# Patient Record
Sex: FEMALE | Race: BLACK OR AFRICAN AMERICAN | NOT HISPANIC OR LATINO | Employment: FULL TIME | URBAN - METROPOLITAN AREA
[De-identification: names, ages, dates, MRNs, and addresses within clinical notes are randomized per-mention and may not be internally consistent; named-entity substitution may affect disease eponyms.]

---

## 2023-01-17 PROCEDURE — 99284 EMERGENCY DEPT VISIT MOD MDM: CPT

## 2023-01-17 RX ORDER — SODIUM CHLORIDE 0.9 % (FLUSH) 0.9 %
10 SYRINGE (ML) INJECTION AS NEEDED
Status: DISCONTINUED | OUTPATIENT
Start: 2023-01-17 | End: 2023-01-18

## 2023-01-17 RX ORDER — ASPIRIN 81 MG/1
324 TABLET, CHEWABLE ORAL ONCE
Status: COMPLETED | OUTPATIENT
Start: 2023-01-18 | End: 2023-01-18

## 2023-01-18 ENCOUNTER — HOSPITAL ENCOUNTER (OUTPATIENT)
Facility: HOSPITAL | Age: 53
Discharge: HOME | End: 2023-01-18
Attending: EMERGENCY MEDICINE | Admitting: INTERNAL MEDICINE
Payer: COMMERCIAL

## 2023-01-18 ENCOUNTER — APPOINTMENT (OUTPATIENT)
Dept: GENERAL RADIOLOGY | Facility: HOSPITAL | Age: 53
End: 2023-01-18
Payer: COMMERCIAL

## 2023-01-18 ENCOUNTER — APPOINTMENT (OUTPATIENT)
Dept: CARDIOLOGY | Facility: HOSPITAL | Age: 53
End: 2023-01-18
Payer: COMMERCIAL

## 2023-01-18 VITALS
TEMPERATURE: 98.2 F | SYSTOLIC BLOOD PRESSURE: 151 MMHG | RESPIRATION RATE: 16 BRPM | BODY MASS INDEX: 37.56 KG/M2 | WEIGHT: 262.35 LBS | HEIGHT: 70 IN | OXYGEN SATURATION: 97 % | DIASTOLIC BLOOD PRESSURE: 93 MMHG | HEART RATE: 84 BPM

## 2023-01-18 DIAGNOSIS — Z86.39 HISTORY OF OBESITY: ICD-10-CM

## 2023-01-18 DIAGNOSIS — R11.0 NAUSEA: ICD-10-CM

## 2023-01-18 DIAGNOSIS — Z86.2 HISTORY OF ANEMIA: ICD-10-CM

## 2023-01-18 DIAGNOSIS — R68.84 JAW PAIN: ICD-10-CM

## 2023-01-18 DIAGNOSIS — E87.6 HYPOKALEMIA: ICD-10-CM

## 2023-01-18 DIAGNOSIS — R07.89 ATYPICAL CHEST PAIN: Primary | ICD-10-CM

## 2023-01-18 DIAGNOSIS — Z86.79 HISTORY OF HYPERTENSION: ICD-10-CM

## 2023-01-18 DIAGNOSIS — R06.02 SHORTNESS OF BREATH: ICD-10-CM

## 2023-01-18 PROBLEM — I10 HTN (HYPERTENSION): Status: ACTIVE | Noted: 2023-01-18

## 2023-01-18 PROBLEM — R07.9 CHEST PAIN: Status: ACTIVE | Noted: 2023-01-18

## 2023-01-18 LAB
ALBUMIN SERPL-MCNC: 4.1 G/DL (ref 3.5–5.2)
ALBUMIN/GLOB SERPL: 1.3 G/DL
ALP SERPL-CCNC: 80 U/L (ref 39–117)
ALT SERPL W P-5'-P-CCNC: 20 U/L (ref 1–33)
ANION GAP SERPL CALCULATED.3IONS-SCNC: 8 MMOL/L (ref 5–15)
ANION GAP SERPL CALCULATED.3IONS-SCNC: 9 MMOL/L (ref 5–15)
AST SERPL-CCNC: 19 U/L (ref 1–32)
BASOPHILS # BLD AUTO: 0.02 10*3/MM3 (ref 0–0.2)
BASOPHILS NFR BLD AUTO: 0.3 % (ref 0–1.5)
BH CV REST NUCLEAR ISOTOPE DOSE: 33 MCI
BH CV STRESS BP STAGE 1: NORMAL
BH CV STRESS BP STAGE 3: NORMAL
BH CV STRESS COMMENTS STAGE 1: NORMAL
BH CV STRESS DOSE REGADENOSON STAGE 1: 0.4
BH CV STRESS DURATION MIN STAGE 1: 1
BH CV STRESS DURATION MIN STAGE 2: 1
BH CV STRESS DURATION MIN STAGE 3: 1
BH CV STRESS DURATION MIN STAGE 4: 1
BH CV STRESS DURATION SEC STAGE 1: 0
BH CV STRESS DURATION SEC STAGE 2: 0
BH CV STRESS DURATION SEC STAGE 3: 0
BH CV STRESS DURATION SEC STAGE 4: 0
BH CV STRESS HR STAGE 1: 87
BH CV STRESS HR STAGE 2: 99
BH CV STRESS HR STAGE 4: 91
BH CV STRESS NUCLEAR ISOTOPE DOSE: 33 MCI
BH CV STRESS O2 STAGE 1: 96
BH CV STRESS O2 STAGE 2: 99
BH CV STRESS O2 STAGE 3: 99
BH CV STRESS O2 STAGE 4: 98
BH CV STRESS PROTOCOL 1: NORMAL
BH CV STRESS RECOVERY BP: NORMAL MMHG
BH CV STRESS RECOVERY HR: 85 BPM
BH CV STRESS RECOVERY O2: 97 %
BH CV STRESS STAGE 1: 1
BH CV STRESS STAGE 2: 2
BH CV STRESS STAGE 3: 3
BH CV STRESS STAGE 4: 4
BILIRUB SERPL-MCNC: 0.3 MG/DL (ref 0–1.2)
BUN SERPL-MCNC: 10 MG/DL (ref 6–20)
BUN SERPL-MCNC: 11 MG/DL (ref 6–20)
BUN/CREAT SERPL: 12.5 (ref 7–25)
BUN/CREAT SERPL: 13.3 (ref 7–25)
CALCIUM SPEC-SCNC: 9.5 MG/DL (ref 8.6–10.5)
CALCIUM SPEC-SCNC: 9.6 MG/DL (ref 8.6–10.5)
CHLORIDE SERPL-SCNC: 103 MMOL/L (ref 98–107)
CHLORIDE SERPL-SCNC: 103 MMOL/L (ref 98–107)
CHOLEST SERPL-MCNC: 181 MG/DL (ref 0–200)
CO2 SERPL-SCNC: 28 MMOL/L (ref 22–29)
CO2 SERPL-SCNC: 29 MMOL/L (ref 22–29)
CREAT SERPL-MCNC: 0.8 MG/DL (ref 0.57–1)
CREAT SERPL-MCNC: 0.83 MG/DL (ref 0.57–1)
DEPRECATED RDW RBC AUTO: 41.8 FL (ref 37–54)
DEPRECATED RDW RBC AUTO: 42.5 FL (ref 37–54)
EGFRCR SERPLBLD CKD-EPI 2021: 85.5 ML/MIN/1.73
EGFRCR SERPLBLD CKD-EPI 2021: 89.3 ML/MIN/1.73
EOSINOPHIL # BLD AUTO: 0.05 10*3/MM3 (ref 0–0.4)
EOSINOPHIL NFR BLD AUTO: 0.7 % (ref 0.3–6.2)
ERYTHROCYTE [DISTWIDTH] IN BLOOD BY AUTOMATED COUNT: 14 % (ref 12.3–15.4)
ERYTHROCYTE [DISTWIDTH] IN BLOOD BY AUTOMATED COUNT: 14.2 % (ref 12.3–15.4)
GLOBULIN UR ELPH-MCNC: 3.1 GM/DL
GLUCOSE SERPL-MCNC: 107 MG/DL (ref 65–99)
GLUCOSE SERPL-MCNC: 109 MG/DL (ref 65–99)
HBA1C MFR BLD: 6.1 % (ref 4.8–5.6)
HCT VFR BLD AUTO: 35.5 % (ref 34–46.6)
HCT VFR BLD AUTO: 36.3 % (ref 34–46.6)
HDLC SERPL-MCNC: 39 MG/DL (ref 40–60)
HGB BLD-MCNC: 11.2 G/DL (ref 12–15.9)
HGB BLD-MCNC: 11.4 G/DL (ref 12–15.9)
HOLD SPECIMEN: NORMAL
IMM GRANULOCYTES # BLD AUTO: 0.01 10*3/MM3 (ref 0–0.05)
IMM GRANULOCYTES NFR BLD AUTO: 0.1 % (ref 0–0.5)
LDLC SERPL CALC-MCNC: 126 MG/DL (ref 0–100)
LDLC/HDLC SERPL: 3.18 {RATIO}
LIPASE SERPL-CCNC: 27 U/L (ref 13–60)
LV EF NUC BP: 65 %
LYMPHOCYTES # BLD AUTO: 2.96 10*3/MM3 (ref 0.7–3.1)
LYMPHOCYTES NFR BLD AUTO: 43.6 % (ref 19.6–45.3)
MAGNESIUM SERPL-MCNC: 2.1 MG/DL (ref 1.6–2.6)
MAXIMAL PREDICTED HEART RATE: 169 BPM
MCH RBC QN AUTO: 25.7 PG (ref 26.6–33)
MCH RBC QN AUTO: 25.8 PG (ref 26.6–33)
MCHC RBC AUTO-ENTMCNC: 31.4 G/DL (ref 31.5–35.7)
MCHC RBC AUTO-ENTMCNC: 31.5 G/DL (ref 31.5–35.7)
MCV RBC AUTO: 81.8 FL (ref 79–97)
MCV RBC AUTO: 81.9 FL (ref 79–97)
MONOCYTES # BLD AUTO: 0.52 10*3/MM3 (ref 0.1–0.9)
MONOCYTES NFR BLD AUTO: 7.7 % (ref 5–12)
NEUTROPHILS NFR BLD AUTO: 3.23 10*3/MM3 (ref 1.7–7)
NEUTROPHILS NFR BLD AUTO: 47.6 % (ref 42.7–76)
NRBC BLD AUTO-RTO: 0 /100 WBC (ref 0–0.2)
NT-PROBNP SERPL-MCNC: 52.2 PG/ML (ref 0–900)
PERCENT MAX PREDICTED HR: 58.58 %
PLATELET # BLD AUTO: 213 10*3/MM3 (ref 140–450)
PLATELET # BLD AUTO: 238 10*3/MM3 (ref 140–450)
PMV BLD AUTO: 11.1 FL (ref 6–12)
PMV BLD AUTO: 11.3 FL (ref 6–12)
POTASSIUM SERPL-SCNC: 3.2 MMOL/L (ref 3.5–5.2)
POTASSIUM SERPL-SCNC: 3.7 MMOL/L (ref 3.5–5.2)
PROT SERPL-MCNC: 7.2 G/DL (ref 6–8.5)
QT INTERVAL: 436 MS
QTC INTERVAL: 457 MS
RBC # BLD AUTO: 4.34 10*6/MM3 (ref 3.77–5.28)
RBC # BLD AUTO: 4.43 10*6/MM3 (ref 3.77–5.28)
SODIUM SERPL-SCNC: 140 MMOL/L (ref 136–145)
SODIUM SERPL-SCNC: 140 MMOL/L (ref 136–145)
STRESS BASELINE BP: NORMAL MMHG
STRESS BASELINE HR: 65 BPM
STRESS O2 SAT REST: 96 %
STRESS PERCENT HR: 69 %
STRESS POST ESTIMATED WORKLOAD: 1 METS
STRESS POST EXERCISE DUR MIN: 4 MIN
STRESS POST EXERCISE DUR SEC: 0 SEC
STRESS POST O2 SAT PEAK: 99 %
STRESS POST PEAK BP: NORMAL MMHG
STRESS POST PEAK HR: 99 BPM
STRESS TARGET HR: 144 BPM
TRIGL SERPL-MCNC: 89 MG/DL (ref 0–150)
TROPONIN T SERPL-MCNC: <0.01 NG/ML (ref 0–0.03)
TSH SERPL DL<=0.05 MIU/L-ACNC: 1.63 UIU/ML (ref 0.27–4.2)
VLDLC SERPL-MCNC: 16 MG/DL (ref 5–40)
WBC NRBC COR # BLD: 5.29 10*3/MM3 (ref 3.4–10.8)
WBC NRBC COR # BLD: 6.79 10*3/MM3 (ref 3.4–10.8)
WHOLE BLOOD HOLD COAG: NORMAL
WHOLE BLOOD HOLD SPECIMEN: NORMAL

## 2023-01-18 PROCEDURE — 78431 MYOCRD IMG PET RST&STRS CT: CPT | Performed by: INTERNAL MEDICINE

## 2023-01-18 PROCEDURE — 93005 ELECTROCARDIOGRAM TRACING: CPT | Performed by: EMERGENCY MEDICINE

## 2023-01-18 PROCEDURE — 78431 MYOCRD IMG PET RST&STRS CT: CPT

## 2023-01-18 PROCEDURE — A9555 RB82 RUBIDIUM: HCPCS | Performed by: NURSE PRACTITIONER

## 2023-01-18 PROCEDURE — 80061 LIPID PANEL: CPT | Performed by: NURSE PRACTITIONER

## 2023-01-18 PROCEDURE — 84484 ASSAY OF TROPONIN QUANT: CPT | Performed by: NURSE PRACTITIONER

## 2023-01-18 PROCEDURE — G0378 HOSPITAL OBSERVATION PER HR: HCPCS

## 2023-01-18 PROCEDURE — 71045 X-RAY EXAM CHEST 1 VIEW: CPT

## 2023-01-18 PROCEDURE — 99234 HOSP IP/OBS SM DT SF/LOW 45: CPT | Performed by: INTERNAL MEDICINE

## 2023-01-18 PROCEDURE — 25010000002 REGADENOSON 0.4 MG/5ML SOLUTION: Performed by: NURSE PRACTITIONER

## 2023-01-18 PROCEDURE — 36415 COLL VENOUS BLD VENIPUNCTURE: CPT

## 2023-01-18 PROCEDURE — 0 RUBIDIUM CHLORIDE: Performed by: NURSE PRACTITIONER

## 2023-01-18 PROCEDURE — 83690 ASSAY OF LIPASE: CPT | Performed by: EMERGENCY MEDICINE

## 2023-01-18 PROCEDURE — 80048 BASIC METABOLIC PNL TOTAL CA: CPT | Performed by: NURSE PRACTITIONER

## 2023-01-18 PROCEDURE — 96374 THER/PROPH/DIAG INJ IV PUSH: CPT

## 2023-01-18 PROCEDURE — 84484 ASSAY OF TROPONIN QUANT: CPT | Performed by: EMERGENCY MEDICINE

## 2023-01-18 PROCEDURE — 96375 TX/PRO/DX INJ NEW DRUG ADDON: CPT

## 2023-01-18 PROCEDURE — 83036 HEMOGLOBIN GLYCOSYLATED A1C: CPT | Performed by: INTERNAL MEDICINE

## 2023-01-18 PROCEDURE — 25010000002 KETOROLAC TROMETHAMINE PER 15 MG: Performed by: PHYSICIAN ASSISTANT

## 2023-01-18 PROCEDURE — 93005 ELECTROCARDIOGRAM TRACING: CPT | Performed by: NURSE PRACTITIONER

## 2023-01-18 PROCEDURE — 85027 COMPLETE CBC AUTOMATED: CPT | Performed by: NURSE PRACTITIONER

## 2023-01-18 PROCEDURE — 93018 CV STRESS TEST I&R ONLY: CPT | Performed by: INTERNAL MEDICINE

## 2023-01-18 PROCEDURE — 83735 ASSAY OF MAGNESIUM: CPT | Performed by: INTERNAL MEDICINE

## 2023-01-18 PROCEDURE — 83880 ASSAY OF NATRIURETIC PEPTIDE: CPT | Performed by: EMERGENCY MEDICINE

## 2023-01-18 PROCEDURE — 93010 ELECTROCARDIOGRAM REPORT: CPT | Performed by: INTERNAL MEDICINE

## 2023-01-18 PROCEDURE — 93017 CV STRESS TEST TRACING ONLY: CPT

## 2023-01-18 PROCEDURE — 80050 GENERAL HEALTH PANEL: CPT | Performed by: EMERGENCY MEDICINE

## 2023-01-18 PROCEDURE — 25010000002 MORPHINE PER 10 MG: Performed by: INTERNAL MEDICINE

## 2023-01-18 PROCEDURE — 93005 ELECTROCARDIOGRAM TRACING: CPT

## 2023-01-18 RX ORDER — SODIUM CHLORIDE 9 MG/ML
40 INJECTION, SOLUTION INTRAVENOUS AS NEEDED
Status: DISCONTINUED | OUTPATIENT
Start: 2023-01-18 | End: 2023-01-18 | Stop reason: HOSPADM

## 2023-01-18 RX ORDER — LOSARTAN POTASSIUM 25 MG/1
25 TABLET ORAL DAILY
Qty: 90 TABLET | Refills: 2 | Status: SHIPPED | OUTPATIENT
Start: 2023-01-18 | End: 2023-04-18

## 2023-01-18 RX ORDER — MORPHINE SULFATE 2 MG/ML
2 INJECTION, SOLUTION INTRAMUSCULAR; INTRAVENOUS EVERY 4 HOURS PRN
Status: DISCONTINUED | OUTPATIENT
Start: 2023-01-18 | End: 2023-01-18 | Stop reason: HOSPADM

## 2023-01-18 RX ORDER — HYDROCHLOROTHIAZIDE 12.5 MG/1
12.5 CAPSULE, GELATIN COATED ORAL DAILY
Status: DISCONTINUED | OUTPATIENT
Start: 2023-01-18 | End: 2023-01-18 | Stop reason: HOSPADM

## 2023-01-18 RX ORDER — ASPIRIN 81 MG/1
81 TABLET ORAL DAILY
Status: DISCONTINUED | OUTPATIENT
Start: 2023-01-18 | End: 2023-01-18 | Stop reason: HOSPADM

## 2023-01-18 RX ORDER — KETOROLAC TROMETHAMINE 15 MG/ML
15 INJECTION, SOLUTION INTRAMUSCULAR; INTRAVENOUS ONCE
Status: COMPLETED | OUTPATIENT
Start: 2023-01-18 | End: 2023-01-18

## 2023-01-18 RX ORDER — HYDROCHLOROTHIAZIDE 12.5 MG/1
12.5 TABLET ORAL DAILY
COMMUNITY
End: 2023-01-18 | Stop reason: HOSPADM

## 2023-01-18 RX ORDER — ACETAMINOPHEN 325 MG/1
650 TABLET ORAL EVERY 6 HOURS PRN
Status: DISCONTINUED | OUTPATIENT
Start: 2023-01-18 | End: 2023-01-18 | Stop reason: HOSPADM

## 2023-01-18 RX ORDER — FAMOTIDINE 10 MG/ML
20 INJECTION, SOLUTION INTRAVENOUS ONCE
Status: COMPLETED | OUTPATIENT
Start: 2023-01-18 | End: 2023-01-18

## 2023-01-18 RX ORDER — SODIUM CHLORIDE 0.9 % (FLUSH) 0.9 %
10 SYRINGE (ML) INJECTION EVERY 12 HOURS SCHEDULED
Status: DISCONTINUED | OUTPATIENT
Start: 2023-01-18 | End: 2023-01-18 | Stop reason: HOSPADM

## 2023-01-18 RX ORDER — SODIUM CHLORIDE 0.9 % (FLUSH) 0.9 %
10 SYRINGE (ML) INJECTION AS NEEDED
Status: DISCONTINUED | OUTPATIENT
Start: 2023-01-18 | End: 2023-01-18 | Stop reason: HOSPADM

## 2023-01-18 RX ORDER — ATORVASTATIN CALCIUM 40 MG/1
80 TABLET, FILM COATED ORAL DAILY
Status: DISCONTINUED | OUTPATIENT
Start: 2023-01-18 | End: 2023-01-18 | Stop reason: HOSPADM

## 2023-01-18 RX ORDER — POTASSIUM CHLORIDE 750 MG/1
40 CAPSULE, EXTENDED RELEASE ORAL ONCE
Status: COMPLETED | OUTPATIENT
Start: 2023-01-18 | End: 2023-01-18

## 2023-01-18 RX ORDER — TRAMADOL HYDROCHLORIDE 50 MG/1
50 TABLET ORAL EVERY 6 HOURS PRN
COMMUNITY

## 2023-01-18 RX ORDER — SODIUM CHLORIDE 0.9 % (FLUSH) 0.9 %
10 SYRINGE (ML) INJECTION AS NEEDED
Status: DISCONTINUED | OUTPATIENT
Start: 2023-01-18 | End: 2023-01-18

## 2023-01-18 RX ADMIN — ASPIRIN 81 MG CHEWABLE TABLET 324 MG: 81 TABLET CHEWABLE at 02:19

## 2023-01-18 RX ADMIN — MORPHINE SULFATE 2 MG: 2 INJECTION, SOLUTION INTRAMUSCULAR; INTRAVENOUS at 06:10

## 2023-01-18 RX ADMIN — HYDROCHLOROTHIAZIDE 12.5 MG: 12.5 CAPSULE ORAL at 10:28

## 2023-01-18 RX ADMIN — ACETAMINOPHEN 650 MG: 325 TABLET ORAL at 12:29

## 2023-01-18 RX ADMIN — ATORVASTATIN CALCIUM 80 MG: 40 TABLET, FILM COATED ORAL at 05:10

## 2023-01-18 RX ADMIN — REGADENOSON 0.4 MG: 0.08 INJECTION, SOLUTION INTRAVENOUS at 09:56

## 2023-01-18 RX ADMIN — ASPIRIN 81 MG: 81 TABLET, COATED ORAL at 10:28

## 2023-01-18 RX ADMIN — KETOROLAC TROMETHAMINE 15 MG: 15 INJECTION, SOLUTION INTRAMUSCULAR; INTRAVENOUS at 02:19

## 2023-01-18 RX ADMIN — NITROGLYCERIN 0.5 INCH: 20 OINTMENT TOPICAL at 02:20

## 2023-01-18 RX ADMIN — FAMOTIDINE 20 MG: 10 INJECTION INTRAVENOUS at 02:20

## 2023-01-18 RX ADMIN — POTASSIUM CHLORIDE 40 MEQ: 750 CAPSULE, EXTENDED RELEASE ORAL at 02:20

## 2023-01-18 RX ADMIN — Medication 10 ML: at 10:29

## 2023-01-18 RX ADMIN — RUBIDIUM CHLORIDE RB-82 1 DOSE: 150 INJECTION, SOLUTION INTRAVENOUS at 09:58

## 2023-01-18 RX ADMIN — RUBIDIUM CHLORIDE RB-82 1 DOSE: 150 INJECTION, SOLUTION INTRAVENOUS at 09:47

## 2023-01-18 NOTE — Clinical Note
Level of Care: Telemetry [5]   Diagnosis: Chest pain [142756]   Admitting Physician: BRIANNA BENSON [[id number]]   Attending Physician: BRIANNA BENSON [[id number]]

## 2023-01-18 NOTE — ED PROVIDER NOTES
Subjective   History of Present Illness  This is a 52-year-old female that presents the ER with 24-hour history of waxing and waning left-sided chest pain.  Patient describes pain as aching and dull with intermittent sharp pains.  Patient also describes associated shortness of breath and nausea.  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  Patient said that pain significantly worsened after she got off work yesterday morning on 1/17/2023.  Patient reported a busy night at the hospital.  Patient tried to rest but continued to have left-sided chest discomfort throughout the day and then she started to experience left jaw and left upper extremity pain, which was quite concerning.  She denies any recent URI symptoms or cough/chest congestion.  She denies any lower extremity pedal edema.  Patient is a non-smoker.  She has past medical history significant for obesity with BMI of 39, hypertension, and anemia.  She denies family history of CAD.  Patient had a normal stress test approximately 5 years ago.  She did not take anything OTC for the above symptoms of discomfort.  She describes chest pain at a 8 out of 10 upon arrival.    History provided by:  Patient  Chest Pain  Pain location:  L chest (underneath left breast)  Pain quality: aching, dull and sharp    Pain quality comment:  Constant dull aching with intermittent sharp pain  Pain radiates to:  L jaw and L arm  Duration:  24 hours  Timing:  Intermittent  Progression:  Waxing and waning  Chronicity:  New  Context comment:  Pt got off work yesterday morning.  Onset of left sided CP with radiation to left arm and assoc SOA and nausea.  No recent cough/congestion. H/o HTN.  Relieved by:  Nothing  Worsened by:  Nothing  Ineffective treatments:  None tried  Associated symptoms: anorexia, heartburn, nausea and shortness of breath    Associated symptoms: no abdominal pain, no anxiety, no back pain, no claudication, no cough,  no diaphoresis, no dizziness, no fatigue, no fever, no headache, no lower extremity edema, no near-syncope, no palpitations, no syncope, no vomiting and no weakness    Risk factors: hypertension and obesity    Risk factors: no smoking    Risk factors comment:  Personal h/o stress test 5 years ago; normal.  No family history of CAD.      Review of Systems   Constitutional: Positive for appetite change. Negative for diaphoresis, fatigue and fever.   HENT: Negative.  Negative for congestion, ear pain, postnasal drip, rhinorrhea, sinus pressure, sinus pain, sneezing and sore throat.    Respiratory: Positive for shortness of breath. Negative for cough.    Cardiovascular: Positive for chest pain. Negative for palpitations, claudication, leg swelling, syncope and near-syncope.   Gastrointestinal: Positive for anorexia, heartburn and nausea. Negative for abdominal pain, constipation, diarrhea and vomiting.   Genitourinary: Negative.  Negative for dysuria, flank pain, frequency and urgency.   Musculoskeletal: Negative.  Negative for back pain.   Neurological: Negative.  Negative for dizziness, syncope, facial asymmetry, weakness, light-headedness and headaches.   All other systems reviewed and are negative.      Past Medical History:   Diagnosis Date   • Anemia    • Hypertension        Not on File    Past Surgical History:   Procedure Laterality Date   • CHOLECYSTECTOMY     • HERNIA REPAIR         Family History   Problem Relation Age of Onset   • Hypertension Mother    • Diabetes Father    • Stroke Father    • Diabetes Brother        Social History     Socioeconomic History   • Marital status:    Tobacco Use   • Smoking status: Never   Substance and Sexual Activity   • Alcohol use: Never   • Drug use: Never           Objective   Physical Exam  Vitals and nursing note reviewed.   Constitutional:       General: She is not in acute distress.     Appearance: Normal appearance. She is obese. She is not ill-appearing,  toxic-appearing or diaphoretic.      Comments: No acute sign of distress.  Patient rates chest pain 8 out of 10.  Nontoxic.   HENT:      Head: Normocephalic and atraumatic.      Nose: Nose normal.      Mouth/Throat:      Mouth: Mucous membranes are moist.      Pharynx: Oropharynx is clear.   Eyes:      Extraocular Movements: Extraocular movements intact.      Conjunctiva/sclera: Conjunctivae normal.      Pupils: Pupils are equal, round, and reactive to light.   Cardiovascular:      Rate and Rhythm: Normal rate and regular rhythm.  No extrasystoles are present.     Pulses: Normal pulses.      Heart sounds: Normal heart sounds. No murmur heard.     Comments: Regular rate and rhythm.  No ectopy.  No pedal edema to lower extremities.  Pulmonary:      Effort: Pulmonary effort is normal. No tachypnea or accessory muscle usage.      Breath sounds: Normal breath sounds.      Comments: No tachypnea or retractions.  Good air exchange to bilateral lung fields.  No pleuritic type chest pain elicited with deep inspiration.  Chest:      Chest wall: Tenderness present. No deformity, swelling or crepitus.      Comments: Patient had some mild tenderness to left lower rib border.  No skin lesions or rash appreciated.  Abdominal:      General: Bowel sounds are normal. There is no distension.      Palpations: Abdomen is soft.      Tenderness: There is no abdominal tenderness. There is no right CVA tenderness, left CVA tenderness, guarding or rebound.      Comments: Abdomen soft and nontender.  No flank or CVA tenderness.   Musculoskeletal:         General: Normal range of motion.      Cervical back: Normal range of motion and neck supple.      Right lower leg: No edema.      Left lower leg: No edema.   Skin:     General: Skin is warm and dry.   Neurological:      General: No focal deficit present.      Mental Status: She is alert.         Procedures           ED Course  ED Course as of 01/18/23 0501   Wed Jan 18, 2023   0454 EKGs x2  show normal sinus rhythm.  No acute ST-T wave changes consistent with ischemia.  CBC was within normal limits.  H&H was 11.4 and 36.3.  Chemistries were also essentially normal except for mild hypokalemia with potassium level of 3.2.  I ordered KCl 40 mEq by mouth x1 dose.  TSH is 1.63.  BNP is 52.  Troponins x2 sets are less than 0.010.  I ordered Pepcid IV, half inch of topical nitroglycerin paste, Toradol, and oral potassium.  Pain improved from 8 out of 10 to 5 out of 10 upon reassessment.  Patient resting comfortably.  Differential diagnoses includes angina.  Patient also has some chest wall tenderness.  Heart score is 3.  Since pain improved with nitro, recommend cardiac rule out per MI protocol.  Discussed admission with Dr. Hernandez, hospitalist.  He is agreeable to admission on telemetry. [FC]      ED Course User Index  [FC] Santa Whitman, SANTOS            Recent Results (from the past 24 hour(s))   ECG 12 Lead ED Triage Standing Order; Chest Pain    Collection Time: 01/18/23 12:05 AM   Result Value Ref Range    QT Interval 418 ms    QTC Interval 476 ms   Troponin    Collection Time: 01/18/23 12:11 AM    Specimen: Blood   Result Value Ref Range    Troponin T <0.010 0.000 - 0.030 ng/mL   Comprehensive Metabolic Panel    Collection Time: 01/18/23 12:11 AM    Specimen: Blood   Result Value Ref Range    Glucose 109 (H) 65 - 99 mg/dL    BUN 11 6 - 20 mg/dL    Creatinine 0.83 0.57 - 1.00 mg/dL    Sodium 140 136 - 145 mmol/L    Potassium 3.2 (L) 3.5 - 5.2 mmol/L    Chloride 103 98 - 107 mmol/L    CO2 28.0 22.0 - 29.0 mmol/L    Calcium 9.6 8.6 - 10.5 mg/dL    Total Protein 7.2 6.0 - 8.5 g/dL    Albumin 4.1 3.5 - 5.2 g/dL    ALT (SGPT) 20 1 - 33 U/L    AST (SGOT) 19 1 - 32 U/L    Alkaline Phosphatase 80 39 - 117 U/L    Total Bilirubin 0.3 0.0 - 1.2 mg/dL    Globulin 3.1 gm/dL    A/G Ratio 1.3 g/dL    BUN/Creatinine Ratio 13.3 7.0 - 25.0    Anion Gap 9.0 5.0 - 15.0 mmol/L    eGFR 85.5 >60.0 mL/min/1.73   Lipase     Collection Time: 01/18/23 12:11 AM    Specimen: Blood   Result Value Ref Range    Lipase 27 13 - 60 U/L   BNP    Collection Time: 01/18/23 12:11 AM    Specimen: Blood   Result Value Ref Range    proBNP 52.2 0.0 - 900.0 pg/mL   Green Top (Gel)    Collection Time: 01/18/23 12:11 AM   Result Value Ref Range    Extra Tube Hold for add-ons.    Lavender Top    Collection Time: 01/18/23 12:11 AM   Result Value Ref Range    Extra Tube hold for add-on    Gold Top - SST    Collection Time: 01/18/23 12:11 AM   Result Value Ref Range    Extra Tube Hold for add-ons.    Gray Top    Collection Time: 01/18/23 12:11 AM   Result Value Ref Range    Extra Tube Hold for add-ons.    Light Blue Top    Collection Time: 01/18/23 12:11 AM   Result Value Ref Range    Extra Tube Hold for add-ons.    CBC Auto Differential    Collection Time: 01/18/23 12:11 AM    Specimen: Blood   Result Value Ref Range    WBC 6.79 3.40 - 10.80 10*3/mm3    RBC 4.43 3.77 - 5.28 10*6/mm3    Hemoglobin 11.4 (L) 12.0 - 15.9 g/dL    Hematocrit 36.3 34.0 - 46.6 %    MCV 81.9 79.0 - 97.0 fL    MCH 25.7 (L) 26.6 - 33.0 pg    MCHC 31.4 (L) 31.5 - 35.7 g/dL    RDW 14.2 12.3 - 15.4 %    RDW-SD 42.5 37.0 - 54.0 fl    MPV 11.1 6.0 - 12.0 fL    Platelets 238 140 - 450 10*3/mm3    Neutrophil % 47.6 42.7 - 76.0 %    Lymphocyte % 43.6 19.6 - 45.3 %    Monocyte % 7.7 5.0 - 12.0 %    Eosinophil % 0.7 0.3 - 6.2 %    Basophil % 0.3 0.0 - 1.5 %    Immature Grans % 0.1 0.0 - 0.5 %    Neutrophils, Absolute 3.23 1.70 - 7.00 10*3/mm3    Lymphocytes, Absolute 2.96 0.70 - 3.10 10*3/mm3    Monocytes, Absolute 0.52 0.10 - 0.90 10*3/mm3    Eosinophils, Absolute 0.05 0.00 - 0.40 10*3/mm3    Basophils, Absolute 0.02 0.00 - 0.20 10*3/mm3    Immature Grans, Absolute 0.01 0.00 - 0.05 10*3/mm3    nRBC 0.0 0.0 - 0.2 /100 WBC   TSH    Collection Time: 01/18/23 12:11 AM    Specimen: Blood   Result Value Ref Range    TSH 1.630 0.270 - 4.200 uIU/mL   ECG 12 Lead ED Triage Standing Order; Chest Pain     "Collection Time: 01/18/23  2:11 AM   Result Value Ref Range    QT Interval 410 ms    QTC Interval 461 ms   Troponin    Collection Time: 01/18/23  2:35 AM    Specimen: Blood   Result Value Ref Range    Troponin T <0.010 0.000 - 0.030 ng/mL   Magnesium    Collection Time: 01/18/23  2:35 AM    Specimen: Blood   Result Value Ref Range    Magnesium 2.1 1.6 - 2.6 mg/dL     Note: In addition to lab results from this visit, the labs listed above may include labs taken at another facility or during a different encounter within the last 24 hours. Please correlate lab times with ED admission and discharge times for further clarification of the services performed during this visit.    XR Chest 1 View   Final Result      No acute cardiopulmonary process.      Electronically signed by:  Earle Cabezas D.O.     1/17/2023 11:55 PM Mountain Time        Vitals:    01/17/23 2353   BP: 153/98   BP Location: Left arm   Patient Position: Sitting   Pulse: 70   Resp: 18   Temp: 98.4 °F (36.9 °C)   TempSrc: Oral   SpO2: 97%   Weight: 123 kg (272 lb)   Height: 177.8 cm (70\")     Medications   sodium chloride 0.9 % flush 10 mL (has no administration in time range)   sodium chloride 0.9 % flush 10 mL (has no administration in time range)   morphine injection 2 mg (has no administration in time range)   aspirin EC tablet 81 mg (has no administration in time range)   atorvastatin (LIPITOR) tablet 80 mg (has no administration in time range)   hydroCHLOROthiazide (MICROZIDE) capsule 12.5 mg (has no administration in time range)   aspirin chewable tablet 324 mg (324 mg Oral Given 1/18/23 0219)   ketorolac (TORADOL) injection 15 mg (15 mg Intravenous Given 1/18/23 0219)   famotidine (PEPCID) injection 20 mg (20 mg Intravenous Given 1/18/23 0220)   nitroglycerin (NITROSTAT) ointment 0.5 inch (0.5 inches Topical Given 1/18/23 0220)   potassium chloride (MICRO-K) CR capsule 40 mEq (40 mEq Oral Given 1/18/23 0220)     ECG/EMG Results (last 24 hours)  "    Procedure Component Value Units Date/Time    ECG 12 Lead ED Triage Standing Order; Chest Pain [[id number]] Collected: 01/18/23 0005     Updated: 01/18/23 0005     QT Interval 418 ms      QTC Interval 476 ms     Narrative:      Test Reason : ED Triage Standing Order~  Blood Pressure :   */*   mmHG  Vent. Rate :  78 BPM     Atrial Rate :  78 BPM     P-R Int : 186 ms          QRS Dur : 108 ms      QT Int : 418 ms       P-R-T Axes :  42  -1  15 degrees     QTc Int : 476 ms    Normal sinus rhythm  Anterior infarct , age undetermined  Abnormal ECG  No previous ECGs available    Referred By: EDMD           Confirmed By:         ECG 12 Lead ED Triage Standing Order; Chest Pain   Preliminary Result   Test Reason : ED Triage Standing Order~   Blood Pressure :   */*   mmHG   Vent. Rate :  76 BPM     Atrial Rate :  76 BPM      P-R Int : 182 ms          QRS Dur : 110 ms       QT Int : 410 ms       P-R-T Axes :  39   8  20 degrees      QTc Int : 461 ms      Normal sinus rhythm   Cannot rule out Anterior infarct (cited on or before 18-JAN-2023)   Abnormal ECG   When compared with ECG of 18-JAN-2023 00:05, (Unconfirmed)   No significant change was found      Referred By: EDMD           Confirmed By:       ECG 12 Lead ED Triage Standing Order; Chest Pain   Preliminary Result   Test Reason : ED Triage Standing Order~   Blood Pressure :   */*   mmHG   Vent. Rate :  78 BPM     Atrial Rate :  78 BPM      P-R Int : 186 ms          QRS Dur : 108 ms       QT Int : 418 ms       P-R-T Axes :  42  -1  15 degrees      QTc Int : 476 ms      Normal sinus rhythm   Anterior infarct , age undetermined   Abnormal ECG   No previous ECGs available      Referred By: EDMD           Confirmed By:                 HEART Score for Major Cardiac Events - MDCalc  Calculated on Jan 18 2023 3:57 AM  3 points -> Low Score (0-3 points) Risk of MACE of 0.9-1.7%.                             MDM    Final diagnoses:   Atypical chest pain   Jaw pain   Shortness of  breath   Nausea   History of hypertension   History of obesity   Hypokalemia   History of anemia       ED Disposition  ED Disposition     ED Disposition   Decision to Admit    Condition   --    Comment   Level of Care: Telemetry [5]   Diagnosis: Chest pain [141176]   Admitting Physician: BRIANNA BENSON [[id number]]   Attending Physician: BRIANNA BENSON [[id number]]               No follow-up provider specified.       Medication List      No changes were made to your prescriptions during this visit.          Santa Whitman PARamírezC  01/18/23 0506

## 2023-01-18 NOTE — DISCHARGE SUMMARY
[facility] Medicine Services  DISCHARGE SUMMARY    Patient Name: Ana Glasgow  : 1970  MRN: [id number]    Date of Admission: 2023 12:37 AM  Date of Discharge:  2023  Primary Care Physician: Provider, No Known    Consults     No orders found from 2022 to 2023.          Hospital Course     Presenting Problem:   Chest pain [R07.9]    Active Hospital Problems    Diagnosis  POA   • **Chest pain [R07.9]  Yes   • HTN (hypertension) [I10]  Yes   • Hypokalemia [E87.6]  Yes      Resolved Hospital Problems   No resolved problems to display.      Discharge diagnosis:  · Musculoskeletal chest pain, negative stress test  · Essential hypertension  · Dyslipidemia,   · Prediabetes, A1c 6.1%  · Obesity, BMI 37.6      Hospital Course:  Ana Glasgow is a 52 y.o. female with past medical history of essential hypertension, obesity BMI 37.6 who presented to the hospital with left-sided chest pain.  Chest pain was not typical in nature.  Serial troponins x3 were negative.  EKG with no active ischemic changes.  Underwent PET myocardial perfusion scan that was negative for reversible ischemia/low risk study.  Ejection fraction was 65% on the stress test.  Echo was ordered but not done by the time the patient was ready for discharge.  Patient did not have any further chest pain.  It was felt that her chest pain is likely musculoskeletal after dose extensive studies.  Risk stratification work-up revealed that she is prediabetic with A1c of 6.1% and she was advised to lose weight and improve her dietary habits.  Patient declined metformin and wants to try dietary adjustment first.  Also was found to have dyslipidemia with LDL of 126.  Patient declined statins and wants to try dietary adjustment first.  On discharge, her hydrochlorothiazide was switched to losartan 25 mg daily.  She was discharged in a stable condition and chest pain-free      Discharge Follow Up Recommendations  for outpatient labs/diagnostics:  PCP in 1 week    Day of Discharge     HPI:   I have seen and evaluated the patient this morning and in the afternoon.  Comfortable in bed.  In the morning her chest pain was minimal, located in the area below her breast with no active referral radiation.  In the afternoon, she reported that her chest pain is gone.  No nausea, no vomiting, no shortness of breath, no fever.  Hemodynamics stable    Review of Systems  General : no fevers, chills  CVS: No chest pain, palpitations  Respiratory: No cough, dyspnea  GI: No N/V/D, abd pain  10 point review of systems is negative except for what is mentioned in HPI    Vital Signs:   Temp:  [97.5 °F (36.4 °C)-98.4 °F (36.9 °C)] 98.2 °F (36.8 °C)  Heart Rate:  [64-84] 84  Resp:  [16-18] 16  BP: (112-171)/(74-98) 151/93      Physical Exam:  General: Comfortable, not in distress, conversant and cooperative  Head: Atraumatic and normocephalic  Eyes: No Icterus. No pallor  Ears:  Ears appear intact with no abnormalities noted  Throat: No oral lesions, no thrush  Neck: Supple, trachea midline  Lungs: Clear to auscultation bilaterally, equal air entry, no wheezing or crackles  Heart:  Normal S1 and S2, no murmur, no gallop, No JVD, no lower extremity swelling  Abdomen:  Soft, no tenderness, no organomegaly, normal bowel sounds, no organomegaly  Extremities: pulses equal bilaterally  Skin: No bleeding, bruising or rash, normal skin turgor and elasticity  Neurologic: Cranial nerves appear intact with no evidence of facial asymmetry, normal motor and sensory functions in all 4 extremities  Psych: Alert and oriented x 3, normal mood    Pertinent  and/or Most Recent Results     LAB RESULTS:      Lab 01/18/23  1123 01/18/23  0011   WBC 5.29 6.79   HEMOGLOBIN 11.2* 11.4*   HEMATOCRIT 35.5 36.3   PLATELETS 213 238   NEUTROS ABS  --  3.23   IMMATURE GRANS (ABS)  --  0.01   LYMPHS ABS  --  2.96   MONOS ABS  --  0.52   EOS ABS  --  0.05   MCV 81.8 81.9          Lab 01/18/23  1123 01/18/23  0235 01/18/23  0011   SODIUM 140  --  140   POTASSIUM 3.7  --  3.2*   CHLORIDE 103  --  103   CO2 29.0  --  28.0   ANION GAP 8.0  --  9.0   BUN 10  --  11   CREATININE 0.80  --  0.83   EGFR 89.3  --  85.5   GLUCOSE 107*  --  109*   CALCIUM 9.5  --  9.6   MAGNESIUM  --  2.1  --    HEMOGLOBIN A1C 6.10*  --   --    TSH  --   --  1.630         Lab 01/18/23  0011   TOTAL PROTEIN 7.2   ALBUMIN 4.1   GLOBULIN 3.1   ALT (SGPT) 20   AST (SGOT) 19   BILIRUBIN 0.3   ALK PHOS 80   LIPASE 27         Lab 01/18/23  1123 01/18/23  0235 01/18/23  0011   PROBNP  --   --  52.2   TROPONIN T <0.010 <0.010 <0.010         Lab 01/18/23 1123   CHOLESTEROL 181   LDL CHOL 126*   HDL CHOL 39*   TRIGLYCERIDES 89             Brief Urine Lab Results     None        Microbiology Results (last 10 days)     ** No results found for the last 240 hours. **          XR Chest 1 View    Result Date: 1/18/2023  EXAMINATION: Chest one view. DATE: 1/18/2023. COMPARISON: None available. CLINICAL HISTORY: Chest pain. FINDINGS: The lungs and pleural spaces are clear. The cardiomediastinal silhouette and the pulmonary vessels are within normal limits.     No acute cardiopulmonary process. Electronically signed by:  Earle Cabezas D.O.  1/17/2023 11:55 PM Mountain Time    Stress Test With Pet Myocardial Perfusion    Result Date: 1/18/2023  •  Lexiscan stress test completed without complications. •  The patient reported left-sided chest discomfort during the stress test which resolved in recovery.  No diagnostic ST segment abnormalities were noted. •  Myocardial perfusion imaging indicates a normal myocardial perfusion study with no evidence of ischemia. •  Left ventricular ejection fraction is normal (Calculated EF = 65%). •  Impressions are consistent with a low risk study.           Plan for Follow-up of Pending Labs/Results:     Discharge Details        Discharge Medications      New Medications      Instructions Start Date    losartan 25 MG tablet  Commonly known as: Cozaar   Take 1 tablet by mouth Daily         Continue These Medications      Instructions Start Date   traMADol 50 MG tablet  Commonly known as: ULTRAM   50 mg, Oral, Every 6 Hours PRN         Stop These Medications    hydroCHLOROthiazide 12.5 MG tablet  Commonly known as: HYDRODIURIL            Allergies   Allergen Reactions   • Bee Venom Anaphylaxis   • Eggs Or Egg-Derived Products Anaphylaxis   • Influenza Virus Vaccine [Influenza Virus Vac Live Quad] Anaphylaxis         Discharge Disposition:  Home or Self Care    Diet:  Hospital:  Diet Order   Procedures   • Diet: Vegetarian; Vegan (No animal products); Texture: Regular Texture (IDDSI 7); Fluid Consistency: Thin (IDDSI 0)       Activity:  Activity Instructions     Activity as Tolerated            Restrictions or Other Recommendations:  None        CODE STATUS:    Code Status and Medical Interventions:   Ordered at: 01/18/23 0453     Level Of Support Discussed With:    Patient     Code Status (Patient has no pulse and is not breathing):    CPR (Attempt to Resuscitate)     Medical Interventions (Patient has pulse or is breathing):    Full Support       No future appointments.              Oniel Saucedo MD  01/18/23      Time Spent on Discharge:  I spent  36 minutes on this discharge activity which included: face-to-face encounter with the patient, reviewing the data in the system, coordination of the care with the nursing staff as well as consultants, documentation, and entering orders.

## 2023-01-18 NOTE — H&P
[facility] Medicine Services  HISTORY AND PHYSICAL    Patient Name: Ana Glasgow  : 1970  MRN: [id number]  Primary Care Physician: Provider, No Known  Date of admission: 2023    Subjective   Subjective     Chief Complaint:  Chest pain    HPI:  Ana Glasgow is a 52 y.o. female with a history of anemia, HTN, presents to the ED with complaints of chest pain that started on Monday morning around 0800 after getting home from work.  She works night shift as a RN here on [employer] and has not been getting much sleep over the last couple of days.  She reports that her pain is located under her left breast and radiates to her jaw and down her left arm with associated nausea.  She had chest pain in the past but it was not similar to this and was related to a pulled muscle.  No recent injuries.  Currently rates her pain as a 4/10.  She had a negative stress test 5 years ago.  No diaphoresis, shortness of air, cough, edema, abdominal pain, vomiting, diarrhea, or any other complaints at this time.  Troponin x2 is negative.  Patient was given aspirin and Nitropaste in the ED, and is being admitted to the hospital medicine service for further evaluation and management.        Review of Systems   Constitutional: Negative.    Eyes: Negative.    Respiratory: Negative.  Negative for shortness of breath.    Cardiovascular: Positive for chest pain. Negative for palpitations and leg swelling.   Gastrointestinal: Positive for nausea. Negative for abdominal pain, constipation, diarrhea and vomiting.   Endocrine: Negative.    Genitourinary: Negative.    Musculoskeletal: Positive for neck pain. Negative for back pain and neck stiffness.   Skin: Negative.    Allergic/Immunologic: Negative.    Neurological: Positive for numbness (left hand) and headaches. Negative for dizziness, syncope, speech difficulty, weakness and light-headedness.   Hematological: Negative.    Psychiatric/Behavioral: Negative.          All other systems reviewed and are negative.     Personal History     Past Medical History:   Diagnosis Date   • Anemia    • Hypertension              Past Surgical History:   Procedure Laterality Date   • CHOLECYSTECTOMY     • HERNIA REPAIR         Family History:  family history includes Diabetes in her brother and father; Hypertension in her mother; Stroke in her father. Otherwise pertinent FHx was reviewed and unremarkable.     Social History:  reports that she has never smoked. She does not have any smokeless tobacco history on file. She reports that she does not drink alcohol and does not use drugs.  Social History     Social History Narrative   • Not on file       Medications:       Not on File    Objective   Objective     Vital Signs:   Temp:  [98.4 °F (36.9 °C)] 98.4 °F (36.9 °C)  Heart Rate:  [70] 70  Resp:  [18] 18  BP: (153)/(98) 153/98    Physical Exam   Constitutional: Awake, alert, resting in bed  Eyes: PERRLA, sclerae anicteric, no conjunctival injection  HENT: NCAT, mucous membranes moist  Neck: Supple, no thyromegaly, no lymphadenopathy, trachea midline  Respiratory: Clear to auscultation bilaterally, nonlabored respirations   Cardiovascular: RRR, no murmurs, rubs, or gallops, palpable pedal pulses bilaterally, tenderness under left breast  Gastrointestinal: Positive bowel sounds, soft, nontender, nondistended  Musculoskeletal: No bilateral ankle edema, no clubbing or cyanosis to extremities  Psychiatric: Appropriate affect, cooperative  Neurologic: Oriented x 3, strength symmetric in all extremities, Cranial Nerves grossly intact to confrontation, speech clear  Skin: No rashes       Result Review:  I have personally reviewed the results from the time of this admission to 1/18/2023 04:55 EST and agree with these findings:  [x]  Laboratory list / accordion  []  Microbiology  [x]  Radiology  [x]  EKG/Telemetry   []  Cardiology/Vascular   []  Pathology  []  Old records  []  Other:  Most notable  findings include: K+ 3.2    LAB RESULTS:      Lab 01/18/23  0011   WBC 6.79   HEMOGLOBIN 11.4*   HEMATOCRIT 36.3   PLATELETS 238   NEUTROS ABS 3.23   IMMATURE GRANS (ABS) 0.01   LYMPHS ABS 2.96   MONOS ABS 0.52   EOS ABS 0.05   MCV 81.9         Lab 01/18/23  0011   SODIUM 140   POTASSIUM 3.2*   CHLORIDE 103   CO2 28.0   ANION GAP 9.0   BUN 11   CREATININE 0.83   EGFR 85.5   GLUCOSE 109*   CALCIUM 9.6   TSH 1.630         Lab 01/18/23  0011   TOTAL PROTEIN 7.2   ALBUMIN 4.1   GLOBULIN 3.1   ALT (SGPT) 20   AST (SGOT) 19   BILIRUBIN 0.3   ALK PHOS 80   LIPASE 27         Lab 01/18/23  0235 01/18/23  0011   PROBNP  --  52.2   TROPONIN T <0.010 <0.010                 Brief Urine Lab Results     None        Microbiology Results (last 10 days)     ** No results found for the last 240 hours. **          XR Chest 1 View    Result Date: 1/18/2023  EXAMINATION: Chest one view. DATE: 1/18/2023. COMPARISON: None available. CLINICAL HISTORY: Chest pain. FINDINGS: The lungs and pleural spaces are clear. The cardiomediastinal silhouette and the pulmonary vessels are within normal limits.     Impression: No acute cardiopulmonary process. Electronically signed by:  Earle Cabezas D.O.  1/17/2023 11:55 PM Mountain Time          Assessment & Plan   Assessment & Plan       Chest pain    HTN (hypertension)    Hypokalemia    Ana Glasgow is a 52 y.o. female with a history of anemia, HTN, presents to the ED with complaints of chest pain that started on Monday morning around 0800 after getting home from work.       Assessment and Plan:    Chest pain  -- was given aspirin and nitro in the ED  -- chest xray shows no acute cardiopulmonary process  -- troponin < 0.010 x 2  -- npo  -- echo  -- stress test  -- aspirin daily  -- statin  -- am labs    Hypertension  -- continue HCTZ    Hypokalemia  -- K+ 3.2  -- sliding scale replacement  -- bmp in the am    DVT prophylaxis:  SCDS    CODE STATUS:    Level Of Support Discussed With: Patient  Code  Status (Patient has no pulse and is not breathing): CPR (Attempt to Resuscitate)  Medical Interventions (Patient has pulse or is breathing): Full Support      Expected Discharge   1-2 days    This note has been completed as part of a split-shared workflow.     Signature: Electronically signed by MODE Powell, 01/18/23, 4:55 AM EST.  Total time spent: 55  Time spent includes time reviewing chart, face-to-face time, counseling patient/family/caregiver, ordering medications/tests/procedures, communicating with other health care professionals, documenting clinical information in the electronic health record, and coordination of care.       Attending   Admission Attestation       I have performed an independent face-to-face diagnostic evaluation including performing an independent physical examination as documented here.  The documented plan of care above was reviewed and developed with the advanced practice clinician (APC).      Brief Summary Statement:   Ana Glasgow is a 52 y.o. female with past medical history of hypertension who presents to the ER with complaints of chest pain.  Patient reports that she began having some chest pain after getting home from work yesterday morning.  She reports that she has been having some left-sided arm pain and jaw pain.  Reports associated nausea and shortness of air.  She rates pain 4 out of 10 in severity.  Improved after nitroglycerin.  She reports history of a negative stress test 5 years ago.    Work-up showing some potential early T wave inversions in the anterior leads.  Troponins negative x2.  Does have some tenderness on exam to the left side of the chest and therefore etiology could be musculoskeletal.  Stress test has been ordered for this a.m.    Remainder of detailed HPI is as noted by APC and has been reviewed and/or edited by me for completeness.    Attending Physical Exam:  Temp:  [98.4 °F (36.9 °C)] 98.4 °F (36.9 °C)  Heart Rate:  [70] 70  Resp:   [18] 18  BP: (153)/(98) 153/98    Constitutional: Awake, alert, nnontoxic  Eyes: PERRLA, sclerae anicteric, no conjunctival injection  HENT: NCAT, mucous membranes moist  Neck: Supple, no thyromegaly, no lymphadenopathy, trachea midline  Respiratory: Clear to auscultation bilaterally, nonlabored respirations   Cardiovascular: RRR, no murmurs, rubs, or gallops, palpable pedal pulses bilaterally  Gastrointestinal: Positive bowel sounds, soft, nontender, nondistended  Musculoskeletal: No bilateral ankle edema, no clubbing or cyanosis to extremities, tenderness to left chest wall with palpation  Psychiatric: Appropriate affect, cooperative  Neurologic: Oriented x 3, strength symmetric in all extremities, Cranial Nerves grossly intact to confrontation, speech clear  Skin: No rashes      Brief Assessment/Plan :  See detailed assessment and plan developed with APC which I have reviewed and/or edited for completeness.            Dean Hernandez DO  01/18/23       Total time spent: 18  Time spent includes time reviewing chart, face-to-face time, counseling patient/family/caregiver, ordering medications/tests/procedures, communicating with other health care professionals, documenting clinical information in the electronic health record, and coordination of care.

## 2023-01-22 LAB
QT INTERVAL: 410 MS
QT INTERVAL: 418 MS
QTC INTERVAL: 461 MS
QTC INTERVAL: 476 MS

## 2023-02-24 ENCOUNTER — APPOINTMENT (OUTPATIENT)
Dept: GENERAL RADIOLOGY | Facility: HOSPITAL | Age: 53
End: 2023-02-24
Payer: COMMERCIAL

## 2023-02-24 ENCOUNTER — HOSPITAL ENCOUNTER (EMERGENCY)
Facility: HOSPITAL | Age: 53
Discharge: HOME | End: 2023-02-25
Attending: STUDENT IN AN ORGANIZED HEALTH CARE EDUCATION/TRAINING PROGRAM | Admitting: STUDENT IN AN ORGANIZED HEALTH CARE EDUCATION/TRAINING PROGRAM
Payer: COMMERCIAL

## 2023-02-24 DIAGNOSIS — S50.311A ABRASION OF RIGHT ELBOW, INITIAL ENCOUNTER: ICD-10-CM

## 2023-02-24 DIAGNOSIS — M25.561 ACUTE PAIN OF RIGHT KNEE: ICD-10-CM

## 2023-02-24 DIAGNOSIS — M25.551 ACUTE PAIN OF RIGHT HIP: ICD-10-CM
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
## 2023-02-24 PROCEDURE — 73560 X-RAY EXAM OF KNEE 1 OR 2: CPT

## 2023-02-24 PROCEDURE — 73502 X-RAY EXAM HIP UNI 2-3 VIEWS: CPT

## 2023-02-24 PROCEDURE — 99284 EMERGENCY DEPT VISIT MOD MDM: CPT

## 2023-02-24 RX ORDER — IBUPROFEN 600 MG/1
600 TABLET ORAL 3 TIMES DAILY
Qty: 20 TABLET | Refills: 0 | Status: SHIPPED | OUTPATIENT
Start: 2023-02-24

## 2023-02-24 RX ORDER — OXYCODONE HYDROCHLORIDE 5 MG/1
5 TABLET ORAL ONCE
Status: COMPLETED | OUTPATIENT
Start: 2023-02-24 | End: 2023-02-24

## 2023-02-24 RX ORDER — METHOCARBAMOL 750 MG/1
750 TABLET, FILM COATED ORAL 4 TIMES DAILY
Status: DISCONTINUED | OUTPATIENT
Start: 2023-02-24 | End: 2023-02-25 | Stop reason: HOSPADM

## 2023-02-24 RX ORDER — IBUPROFEN 600 MG/1
600 TABLET ORAL ONCE
Status: COMPLETED | OUTPATIENT
Start: 2023-02-24 | End: 2023-02-24

## 2023-02-24 RX ORDER — METHOCARBAMOL 750 MG/1
750 TABLET, FILM COATED ORAL 3 TIMES DAILY
Qty: 9 TABLET | Refills: 0 | Status: SHIPPED | OUTPATIENT
Start: 2023-02-24 | End: 2023-03-03

## 2023-02-24 RX ADMIN — OXYCODONE HYDROCHLORIDE 5 MG: 5 TABLET ORAL at 23:05

## 2023-02-24 RX ADMIN — IBUPROFEN 600 MG: 600 TABLET ORAL at 23:05

## 2023-02-24 RX ADMIN — METHOCARBAMOL 750 MG: 750 TABLET ORAL at 23:05

## 2023-02-25 VITALS
HEART RATE: 67 BPM | TEMPERATURE: 98 F | HEIGHT: 70 IN | OXYGEN SATURATION: 95 % | DIASTOLIC BLOOD PRESSURE: 91 MMHG | BODY MASS INDEX: 38.65 KG/M2 | WEIGHT: 270 LBS | RESPIRATION RATE: 16 BRPM | SYSTOLIC BLOOD PRESSURE: 148 MMHG

## 2023-02-25 RX ORDER — GINSENG 100 MG
1 CAPSULE ORAL EVERY 12 HOURS SCHEDULED
Status: DISCONTINUED | OUTPATIENT
Start: 2023-02-25 | End: 2023-02-25 | Stop reason: HOSPADM

## 2023-02-25 RX ORDER — GABAPENTIN 300 MG/1
300 CAPSULE ORAL 3 TIMES DAILY
Qty: 9 CAPSULE | Refills: 0 | Status: SHIPPED | OUTPATIENT
Start: 2023-02-25 | End: 2023-03-03

## 2023-02-25 RX ORDER — LIDOCAINE 50 MG/G
1 PATCH TOPICAL EVERY 24 HOURS
Qty: 6 PATCH | Refills: 0 | Status: SHIPPED | OUTPATIENT
Start: 2023-02-25

## 2023-02-25 RX ADMIN — BACITRACIN 0.9 G: 500 OINTMENT TOPICAL at 01:41
